# Patient Record
Sex: FEMALE | Race: WHITE
[De-identification: names, ages, dates, MRNs, and addresses within clinical notes are randomized per-mention and may not be internally consistent; named-entity substitution may affect disease eponyms.]

---

## 2019-06-05 ENCOUNTER — HOSPITAL ENCOUNTER (EMERGENCY)
Dept: HOSPITAL 92 - ERS | Age: 64
Discharge: HOME | End: 2019-06-05
Payer: SELF-PAY

## 2019-06-05 DIAGNOSIS — Z79.84: ICD-10-CM

## 2019-06-05 DIAGNOSIS — I10: ICD-10-CM

## 2019-06-05 DIAGNOSIS — R59.0: Primary | ICD-10-CM

## 2019-06-05 DIAGNOSIS — E78.5: ICD-10-CM

## 2019-06-05 DIAGNOSIS — Z79.82: ICD-10-CM

## 2019-06-05 DIAGNOSIS — F32.9: ICD-10-CM

## 2019-06-05 DIAGNOSIS — E11.9: ICD-10-CM

## 2019-06-05 LAB
ALBUMIN SERPL BCG-MCNC: 3.9 G/DL (ref 3.4–4.8)
ALP SERPL-CCNC: 97 U/L (ref 40–150)
ALT SERPL W P-5'-P-CCNC: 12 U/L (ref 8–55)
ANION GAP SERPL CALC-SCNC: 10 MMOL/L (ref 10–20)
AST SERPL-CCNC: 12 U/L (ref 5–34)
BASOPHILS # BLD AUTO: 0 THOU/UL (ref 0–0.2)
BASOPHILS NFR BLD AUTO: 0.1 % (ref 0–1)
BILIRUB SERPL-MCNC: 0.5 MG/DL (ref 0.2–1.2)
BUN SERPL-MCNC: 8 MG/DL (ref 9.8–20.1)
CALCIUM SERPL-MCNC: 9.3 MG/DL (ref 7.8–10.44)
CHLORIDE SERPL-SCNC: 103 MMOL/L (ref 98–107)
CO2 SERPL-SCNC: 27 MMOL/L (ref 23–31)
CREAT CL PREDICTED SERPL C-G-VRATE: 0 ML/MIN (ref 70–130)
EOSINOPHIL # BLD AUTO: 0.6 THOU/UL (ref 0–0.7)
EOSINOPHIL NFR BLD AUTO: 5.7 % (ref 0–10)
GLOBULIN SER CALC-MCNC: 3.1 G/DL (ref 2.4–3.5)
GLUCOSE SERPL-MCNC: 231 MG/DL (ref 80–115)
HGB BLD-MCNC: 12.5 G/DL (ref 12–16)
LYMPHOCYTES # BLD: 1.7 THOU/UL (ref 1.2–3.4)
LYMPHOCYTES NFR BLD AUTO: 17 % (ref 21–51)
MCH RBC QN AUTO: 28.8 PG (ref 27–31)
MCV RBC AUTO: 86.3 FL (ref 78–98)
MONOCYTES # BLD AUTO: 1.2 THOU/UL (ref 0.11–0.59)
MONOCYTES NFR BLD AUTO: 12 % (ref 0–10)
NEUTROPHILS # BLD AUTO: 6.3 THOU/UL (ref 1.4–6.5)
NEUTROPHILS NFR BLD AUTO: 65.1 % (ref 42–75)
PLATELET # BLD AUTO: 285 THOU/UL (ref 130–400)
POTASSIUM SERPL-SCNC: 3.9 MMOL/L (ref 3.5–5.1)
RBC # BLD AUTO: 4.35 MILL/UL (ref 4.2–5.4)
SODIUM SERPL-SCNC: 136 MMOL/L (ref 136–145)
WBC # BLD AUTO: 9.7 THOU/UL (ref 4.8–10.8)

## 2019-06-05 PROCEDURE — 70491 CT SOFT TISSUE NECK W/DYE: CPT

## 2019-06-05 PROCEDURE — 85025 COMPLETE CBC W/AUTO DIFF WBC: CPT

## 2019-06-05 PROCEDURE — 84443 ASSAY THYROID STIM HORMONE: CPT

## 2019-06-05 PROCEDURE — 96374 THER/PROPH/DIAG INJ IV PUSH: CPT

## 2019-06-05 PROCEDURE — 80053 COMPREHEN METABOLIC PANEL: CPT

## 2019-06-05 NOTE — CT
CONTRAST ENHANCED CT IMAGES OF SOFT TISSUE NECK:

6/5/19

 

HISTORY: 

Difficulty swallowing for three days. 

 

Contrast enhanced CT of the soft tissue neck obtained. 

 

There is massive bilateral submandibular, sublingual, deep cervical and spinal accessory chain lympha
denopathy. The extensive lymphadenopathy extends into the supra and subclavicular regions as well as 
involve the axillary regions which are visualized. 

 

Numerous necrotic lymph nodes seen in the left level III and level IV regions. ENT consultation is re
commended. There is high concern for extensive metastatic disease and malignancy.

 

The parotid glands demonstrate no definite evidence of masses except for some bilateral inferior paro
tid lymph node enlargement also likely due to metastatic disease. 

 

IMPRESSION: 

Extensive bilateral lymphadenopathy, worse on the left than on the right with numerous and markedly e
nlarged extranodal spread of tumor with numerous enlarged necrotic lymph nodes. Other non-necrotic ly
mph nodes also extensively present. 

 

POS: [facility]

## 2019-06-12 ENCOUNTER — HOSPITAL ENCOUNTER (OUTPATIENT)
Dept: HOSPITAL 92 - SDC | Age: 64
Discharge: HOME | End: 2019-06-12
Attending: OTOLARYNGOLOGY
Payer: COMMERCIAL

## 2019-06-12 VITALS — BODY MASS INDEX: 40.2 KG/M2

## 2019-06-12 DIAGNOSIS — R59.9: Primary | ICD-10-CM

## 2019-06-12 DIAGNOSIS — F32.9: ICD-10-CM

## 2019-06-12 DIAGNOSIS — G89.29: ICD-10-CM

## 2019-06-12 DIAGNOSIS — E78.00: ICD-10-CM

## 2019-06-12 DIAGNOSIS — Z79.899: ICD-10-CM

## 2019-06-12 DIAGNOSIS — Z79.84: ICD-10-CM

## 2019-06-12 PROCEDURE — 88360 TUMOR IMMUNOHISTOCHEM/MANUAL: CPT

## 2019-06-12 PROCEDURE — 93005 ELECTROCARDIOGRAM TRACING: CPT

## 2019-06-12 PROCEDURE — 88342 IMHCHEM/IMCYTCHM 1ST ANTB: CPT

## 2019-06-12 PROCEDURE — 07T00ZZ RESECTION OF HEAD LYMPHATIC, OPEN APPROACH: ICD-10-PCS | Performed by: OTOLARYNGOLOGY

## 2019-06-12 PROCEDURE — 88184 FLOWCYTOMETRY/ TC 1 MARKER: CPT

## 2019-06-12 PROCEDURE — 93010 ELECTROCARDIOGRAM REPORT: CPT

## 2019-06-12 PROCEDURE — 88341 IMHCHEM/IMCYTCHM EA ADD ANTB: CPT

## 2019-06-12 PROCEDURE — 07T20ZZ RESECTION OF LEFT NECK LYMPHATIC, OPEN APPROACH: ICD-10-PCS | Performed by: OTOLARYNGOLOGY

## 2019-06-12 PROCEDURE — 88307 TISSUE EXAM BY PATHOLOGIST: CPT

## 2019-06-13 NOTE — OP
DATE OF PROCEDURE:  06/12/2019



PREOPERATIVE DIAGNOSIS:  Left neck lymphadenopathy.



POSTOPERATIVE DIAGNOSIS:  Left neck lymphadenopathy.



PROCEDURE:  Excisional biopsy of the lips and neck lymph nodes.



ESTIMATED BLOOD LOSS:  5 mL.



COMPLICATIONS:  None.



ANESTHESIA:  GETA.



DESCRIPTION OF PROCEDURE:  The patient was taken to the operating room and placed

supine on the table.  General endotracheal anesthesia was obtained by the anesthesia

staff.  The head was gently tilted exposing the left neck wound.  The left neck was

then prepped and draped in standard surgical fashion.  Following this, a horizontal

incision was made in left level 5.  An incision was made through then skin,

subcutaneous tissue, and the platysmal layer.  Following this, immediate massive

amount of bulky cervical lymphadenopathy was encountered.  A large 3 cm lymph node

was then excised and sent for fresh pathological analysis.  The wound was irrigated

and then closed using Monocryl stitches and staples for the skin.  The patient

tolerated the procedure well. 







Job ID:  [id number]

## 2019-06-14 NOTE — EKG
Test Reason : 

Blood Pressure : ***/*** mmHG

Vent. Rate : 071 BPM     Atrial Rate : 071 BPM

   P-R Int : 174 ms          QRS Dur : 098 ms

    QT Int : 426 ms       P-R-T Axes : 070 -06 019 degrees

   QTc Int : 462 ms

 

Normal sinus rhythm

ST abnormality, possible digitalis effect

Abnormal ECG

When compared with ECG of 01-DEC-2010 22:58,

T wave inversion less evident in Inferior leads

T wave inversion no longer evident in Anterior leads

QT has lengthened

Confirmed by FERNANDEZ VALLE ([id number]) on 6/14/2019 9:16:17 PM

 

Referred By:  NEIL           Confirmed By:FERNANDEZ VALLE

## 2019-06-25 ENCOUNTER — HOSPITAL ENCOUNTER (OUTPATIENT)
Dept: HOSPITAL 92 - PET | Age: 64
Discharge: HOME | End: 2019-06-25
Attending: INTERNAL MEDICINE
Payer: COMMERCIAL

## 2019-06-25 DIAGNOSIS — C82.31: Primary | ICD-10-CM

## 2019-06-25 PROCEDURE — 78815 PET IMAGE W/CT SKULL-THIGH: CPT

## 2019-06-25 PROCEDURE — A9552 F18 FDG: HCPCS

## 2019-06-25 NOTE — PET
Nuclear medicine FDG PET/CT:

(Positron emission tomography and computed tomography)



DATE:

6/25/2019



HISTORY:

63-year-old female with follicular lymphoma grade 3 a, initial staging



COMPARISON:

no prior PET scans



TECHNIQUE:

IV injection of F-18 fluorodeoxyglucose (FDG) dose: 13.2 mCi.

PET scan and attenuation correction CT performed from skull base to proximal thighs.

PET scan and attenuation correction CT thinner slices performed through head and neck.



FINDINGS:

SUV (standard uptake values) numbers given are maximum SUVs.

QCLR used.



There are significantly enlarged cervical lymph nodes bilaterally at almost all levels. The left-side
d lymph nodes are asymmetrically larger than the right. The largest conglomeration is on the left

at level 4 and level 3, with previous contrast enhanced CT demonstrating central low attenuation sugg
estive of necrosis there. Examples of some of the degree of FDG uptake are as follows:



Left level 2: SUV 6

Left level 1B: SUV 4.2

Left level 5A: SUV 7.2

Left level 5B: SUV 7.5

Left level 4: SUV 6.3

Left level 3: SUV 6.9



Right level 1B: SUV 3.1

Right level 2: SUV 5.2

Right level 3: SUV 3.5

Right level 4: SUV 3.2



There are several abnormally mildly enlarged and abnormally shaped round left axillary and subpectora
l lymph nodes:



Left axillary: SUV 7.1



There are multiple mildly enlarged mediastinal lymph nodes. Most of them have SUVs less than 3, altho
ugh they still appear suspicious based on shape and number. There is 1 left lateral prevascular

space lymph node with SUV 3.5.



Approximate 5.5 x 3.5 cm left para-aortic retroperitoneal lymph node with SUV at least 9.1  ( SUVs of
 up to 13 are obtained, but those measurements probably include FDG within the left proximal

ureter, and therefore are invalid).



Left common iliac lymph node of approximately 2.5- 3 cm SUV 5.3.



1.5 cm right common iliac lymph node with SUV 3.9.



At the right pelvic inlet there is SUV of 7.4 in what appears to be a conglomeration of small bowel l
oops. This may be activity in the lumen. Uncertain whether this is actual lymphoma activity in the

bowel wall.



3 x 3 cm left internal iliac chain lymph node with SUV of 8.5.



1.5 x 1 cm right inguinal lymph node with SUV of 3.8.



Inferior to that, several other enlarged inguinal lymph nodes, including 3 x 2 cm lower right inguina
l lymph node with SUV of 5.0.



At contralateral left lower inguinal nodes. For example, 2.5 x 3.5 cm node with SUV of 5.9.





IMPRESSION:

1) active lymphoma involvement in numerous locations of neck, chest, abdomen, and pelvis.

2) the worst is in the left neck where there is massive lymphadenopathy with SUV up to 7.5.

3) highest SUV involves large left para-aortic retroperitoneal mass with SUV 9.1.





Reported By: Dayton Zhu 

Electronically Signed:  6/25/2019 2:21 PM

## 2019-06-27 ENCOUNTER — HOSPITAL ENCOUNTER (OUTPATIENT)
Dept: HOSPITAL 92 - SDC | Age: 64
Discharge: HOME | End: 2019-06-27
Attending: SURGERY
Payer: COMMERCIAL

## 2019-06-27 VITALS — BODY MASS INDEX: 40 KG/M2

## 2019-06-27 DIAGNOSIS — Z95.1: ICD-10-CM

## 2019-06-27 DIAGNOSIS — E78.00: ICD-10-CM

## 2019-06-27 DIAGNOSIS — I11.9: ICD-10-CM

## 2019-06-27 DIAGNOSIS — F32.9: ICD-10-CM

## 2019-06-27 DIAGNOSIS — Z79.899: ICD-10-CM

## 2019-06-27 DIAGNOSIS — Z79.82: ICD-10-CM

## 2019-06-27 DIAGNOSIS — C82.91: Primary | ICD-10-CM

## 2019-06-27 DIAGNOSIS — Z98.890: ICD-10-CM

## 2019-06-27 PROCEDURE — 71045 X-RAY EXAM CHEST 1 VIEW: CPT

## 2019-06-27 PROCEDURE — C1788 PORT, INDWELLING, IMP: HCPCS

## 2019-06-27 PROCEDURE — 0JH63WZ INSERTION OF TOTALLY IMPLANTABLE VASCULAR ACCESS DEVICE INTO CHEST SUBCUTANEOUS TISSUE AND FASCIA, PERCUTANEOUS APPROACH: ICD-10-PCS | Performed by: SURGERY

## 2019-06-27 PROCEDURE — 76000 FLUOROSCOPY <1 HR PHYS/QHP: CPT

## 2019-06-27 NOTE — OP
DATE OF PROCEDURE:  06/27/2019



PREOPERATIVE DIAGNOSIS:  Lymphoma.



POSTOPERATIVE DIAGNOSIS:  Lymphoma.



PROCEDURE PERFORMED:  Tunneled central line subcutaneous port (MediPort).



ANESTHESIA:  TIVA, local.



ESTIMATED BLOOD LOSS:  Minimal.



COMPLICATIONS:  None.



SPECIMEN:  None.



FINDINGS:  Tip of the catheter at the atriocaval junction.



DESCRIPTION OF PROCEDURE:  The patient was taken to the operating room and laid

supine on the operating room table.  After sedation was obtained, bilateral neck and

chest were prepped and draped in a sterile fashion.  Local anesthetic was

infiltrated over the right internal jugular vein.  Internal jugular vein was

cannulated using a 22-gauge finder needle, followed by a Seldinger needle.

Ultrasound was used to access the vein as well for safety.  A small nick was made to

at the wire entrance site.  A separate 3 cm incision was made in the right upper

chest.  Subcutaneous pocket was made below the lower incision.  Tubing for the

MediPort tunneled from the inferior to superior incision.  Introducer sheath was

placed over the wire into the superior vena cava.  The dilator and wire were

removed.  The end of the catheters sewn into the sheath.  The sheath was peeled

away.  The tip of the catheter was at the atriocaval junction.  MediPort tubing was

cut to fit.  The MediPort at the lower incision, connected to MediPort, which was

sewn into the chest wall in the subcutaneous pocket using 

Prolene.  The MediPort gypsy the blood and flushed without difficulty.  It was

flushed with a heparin flush.  All wounds were irrigated and closed using 3-0

Vicryl, 4-0 Monocryl, 

and Dermabond.  The patient was sent to Recovery in stable condition.  All

instrument counts, needle counts, and lap counts were correct. 







Job ID:  [id number]

## 2019-06-27 NOTE — RAD
EXAM: Single view of the chest



HISTORY:   Mediport placement



COMPARISON: 12/1/2010



FINDINGS: Single view of the chest shows an enlarged but stable cardiomediastinal silhouette.  The pa
tient is status post sternotomy. A right-sided IJ Mediport is seen with its tip in the superior

vena cava. No pneumothorax is seen. There is no evidence of consolidation, mass, or pleural effusion.
 The bones are unremarkable.



IMPRESSION: Status post central line placement without evidence of complication.



Reported By: Mitchell Kee 

Electronically Signed:  6/27/2019 9:55 AM

## 2019-09-06 ENCOUNTER — HOSPITAL ENCOUNTER (OUTPATIENT)
Dept: HOSPITAL 92 - PET | Age: 64
Discharge: HOME | End: 2019-09-06
Attending: INTERNAL MEDICINE
Payer: COMMERCIAL

## 2019-09-06 DIAGNOSIS — C85.90: Primary | ICD-10-CM

## 2019-09-06 DIAGNOSIS — E11.9: ICD-10-CM

## 2019-09-06 PROCEDURE — 78815 PET IMAGE W/CT SKULL-THIGH: CPT

## 2019-09-06 PROCEDURE — A9552 F18 FDG: HCPCS

## 2019-09-06 NOTE — PET
PET CT:

 

HISTORY:  

64-year-old female with high grade large B cell lymphoma. Patient is undergoing chemotherapy. Exam re
quested to evaluate response to therapy. 

 

COMPARISON:  

PET CT dated 06/25/19. 

 

TECHNIQUE:  

PET scanning with CT attenuation correction was performed from the vertex through the proximal thighs
 following the intravenous administration of 13 mCi F18-FDG in the right antecubital fossa. 

 

FINDINGS:

There is residual activity in the left supraclavicular lymph node with a SUV of 2.8. The hyperactivit
y in the remainder of the lymph nodes noted on the previous study has resolved in the interim. 

 

No hypermetabolic pulmonary nodules, liver, adrenal, or skeletal lesions are seen. 

 

There is increased uptake in the right masseter muscle, likely physiologic. There is physiologic acti
vity in the GI and  tracts, and the brain. 

 

The CT scan used for attenuation correction demonstrates no evidence of pleural effusions or ascites.
 There is cholelithiasis, fibroid uterus, and colonic diverticulosis. 

 

The activity in the left supraclavicular lymph node is greater than the mediastinum but less than the
 liver (Deauville 3).

 

IMPRESSION: 

Positive response to therapy with significant improvement since 06/25/19. 

 

 

POS: [facility]

## 2019-11-21 ENCOUNTER — HOSPITAL ENCOUNTER (OUTPATIENT)
Dept: HOSPITAL 92 - PET | Age: 64
Discharge: HOME | End: 2019-11-21
Attending: INTERNAL MEDICINE
Payer: COMMERCIAL

## 2019-11-21 DIAGNOSIS — C82.30: Primary | ICD-10-CM

## 2019-11-21 PROCEDURE — A9552 F18 FDG: HCPCS

## 2019-11-21 PROCEDURE — 78815 PET IMAGE W/CT SKULL-THIGH: CPT

## 2019-11-21 NOTE — PET
PET CT



HISTORY:

Lung mass.



TECHNIQUE:

PET scanning with CT attenuation correction was performed from the vertex of the brain to the proxima
l thighs following the intravenous administration of 12.9 millicuries [id number]. 



COMPARISON: 

PET/CT dated September 6, 2019.



FINDINGS:



Biodistribution: The biodistribution for the exam appears acceptable.



Head and neck: There is appropriate background activity within the brain. The hypermetabolic left sup
raclavicular lymph node demonstrates some mild residual hypermetabolic activity. The peak activity

associated with this lymph node is 2.34 with a mean activity of 2.0. Previously the peak activity was
 2.7 with a mean activity of 2.42. No additional hypermetabolic lymph node is present.



Thorax: No hypermetabolic pulmonary lesion, pleural effusion or lymphadenopathy is present.



Abdomen and pelvis: There is expected background activity within the GI and  systems. No hypermetab
olic mass, lymphadenopathy or ascites is present. There is stable cholelithiasis and a fibroid

uterus. Scarlike prominence in the left periaortic region is stable without associated hypermetabolic
 activity.



Osseous structures and skin: No hypermetabolic skin or osseous lesion is identified. Prominent left g
lenohumeral osteoarthrosis with intra-articular bodies is stable. There is a stable right chest

wall port.



IMPRESSION:

Deauville score of 3.0. PET CT findings consistent with complete response. The hypermetabolic activit
y associated with the left supraclavicular lymph node has decreased from the most recent PET scan

dated September 6, 2019.



Transcribed Date/Time: 11/21/2019 12:20 PM



Reported By: Gene Chavis 

Electronically Signed:  11/21/2019 1:49 PM

## 2020-01-03 ENCOUNTER — HOSPITAL ENCOUNTER (EMERGENCY)
Dept: HOSPITAL 92 - ERS | Age: 65
Discharge: HOME | End: 2020-01-03
Payer: SELF-PAY

## 2020-01-03 DIAGNOSIS — Z79.82: ICD-10-CM

## 2020-01-03 DIAGNOSIS — E78.5: ICD-10-CM

## 2020-01-03 DIAGNOSIS — Z79.899: ICD-10-CM

## 2020-01-03 DIAGNOSIS — F32.9: ICD-10-CM

## 2020-01-03 DIAGNOSIS — E11.9: ICD-10-CM

## 2020-01-03 DIAGNOSIS — I10: ICD-10-CM

## 2020-01-03 DIAGNOSIS — Z79.84: ICD-10-CM

## 2020-01-03 DIAGNOSIS — M48.061: Primary | ICD-10-CM

## 2020-01-03 PROCEDURE — 96372 THER/PROPH/DIAG INJ SC/IM: CPT

## 2020-01-03 PROCEDURE — 72131 CT LUMBAR SPINE W/O DYE: CPT

## 2020-01-03 NOTE — CT
CT LUMBAR SPINE WITHOUT CONTRAST:

 

HISTORY:

Low back pain.

 

FINDINGS:

The vertebral body heights are maintained. Multilevel degenerative changes are seen, manifested by os
teophyte formation, disk space narrowing, broad-based disk bulges and facet hypertrophic changes. The
re are postop changes of left laminectomy at the L4 level. No fracture or subluxation is seen.

 

There is mild central canal stenosis at the L2-L3 level, severe left neural foraminal stenosis at the
 L3-L4 level, mild left sided neural foraminal stenosis, moderate to severe central canal stenosis at
 the L4-L5 level and moderate to severe bilateral neural foraminal stenosis at the L5-S1 level.

 

Incidental note is made of a fibroid uterus.

 

IMPRESSION:

Lumbar spondylosis with multilevel stenotic changes, as discussed above.

 

POS: [facility]

## 2020-02-18 ENCOUNTER — HOSPITAL ENCOUNTER (OUTPATIENT)
Dept: HOSPITAL 92 - BICCT | Age: 65
Discharge: HOME | End: 2020-02-18
Attending: INTERNAL MEDICINE
Payer: COMMERCIAL

## 2020-02-18 DIAGNOSIS — C82.31: Primary | ICD-10-CM

## 2020-02-18 DIAGNOSIS — K80.20: ICD-10-CM

## 2020-02-18 DIAGNOSIS — K57.30: ICD-10-CM

## 2020-02-18 DIAGNOSIS — N28.9: ICD-10-CM

## 2020-02-18 PROCEDURE — 74160 CT ABDOMEN W/CONTRAST: CPT

## 2020-02-18 NOTE — CT
CT ABDOMEN WITH IV CONTRAST

 2/18/2020



CLINICAL INFORMATION:

Follicular lymphoma. Patient complains of severe right sided back and flank pain.



COMPARISON:

 PET/CT exam on 11/21/2019



Technique:

Multiple contiguous axial CT images are obtained through the abdomen and pelvis with IV contrast. Cor
onal reformatted images are provided.



FINDINGS:



Lower Chest: Minimal atelectasis present at the right lung base.

Vessels: Vascular calcifications are seen in the abdominal aorta as well as visualized coronary arter
ies. 



Abdomen:

Portal vein:Patent

Gallbladder: Gallbladder calculi are again visualized.

Liver: within normal limits.

Spleen: within normal limits.

Pancreas: within normal limits.

Adrenals: within normal limits.

Kidneys: Mild scarring superior pole right kidney. There is an 11 mm hypodense lesion in the midporti
on right kidney which is difficult to characterize due to small size but statistically likely

represents a small cyst.



Bowel: Scattered colonic diverticula are seen in the visualized ascending colon. Small amount retaine
d fecal material is seen within the colon. Loops of small bowel are normal in caliber.



Peritoneum: No ascites or free air; no fluid collection.

Mesentery and Retroperitoneum: There is irregular soft tissue density seen in a left periaortic locat
ion greater along the left lateral aspect of the normal aorta below the level of the renal vessels.

This area was also seen on prior PET/CT examination 11/21/2019, but this area does not demonstrate ab
normal uptake of FDG. This was in an area of bulky lymphadenopathy on prior PET/CT exam on

6/25/2029. Findings are likely attributable to response to therapy with residual soft tissue density 
in this region. No new enlarged lymph nodes are seen by CT size criteria.

Abdominal Wall: Tiny fat-containing umbilical hernia.



Bones: Degenerative changes throughout the spine.



Imaging of the pelvis was not performed.



IMPRESSION:



1. Residual soft tissue density in the left periaortic location. This was also seen on prior PET/CT e
xam on 11/21/2019, but no abnormal FDG uptake was seen in this region. This area was in region of

prior bulky lymphadenopathy. Findings are likely related to response to therapy.

No new enlarged lymph nodes are seen within the abdomen.

2. Cholelithiasis.

3. Difficult to characterize hypodense lesion right kidney statistically likely representing a cyst.

4. Colonic diverticulosis.



Reported By: Mehran Rapp 

Electronically Signed:  2/18/2020 9:57 AM

## 2020-09-03 ENCOUNTER — HOSPITAL ENCOUNTER (OUTPATIENT)
Dept: HOSPITAL 92 - LABBT | Age: 65
Discharge: HOME | End: 2020-09-03
Attending: INTERNAL MEDICINE
Payer: COMMERCIAL

## 2020-09-03 DIAGNOSIS — Z01.812: Primary | ICD-10-CM

## 2020-09-03 DIAGNOSIS — Z20.828: ICD-10-CM

## 2020-09-03 LAB
ALBUMIN SERPL BCG-MCNC: 4.4 G/DL (ref 3.4–4.8)
ALP SERPL-CCNC: 100 U/L (ref 40–110)
ALT SERPL W P-5'-P-CCNC: 21 U/L (ref 8–55)
ANION GAP SERPL CALC-SCNC: 15 MMOL/L (ref 10–20)
AST SERPL-CCNC: 18 U/L (ref 5–34)
BASOPHILS # BLD AUTO: 0 THOU/UL (ref 0–0.2)
BASOPHILS NFR BLD AUTO: 0.5 % (ref 0–1)
BILIRUB SERPL-MCNC: 0.5 MG/DL (ref 0.2–1.2)
BUN SERPL-MCNC: 13 MG/DL (ref 9.8–20.1)
CALCIUM SERPL-MCNC: 9.4 MG/DL (ref 7.8–10.44)
CHLORIDE SERPL-SCNC: 103 MMOL/L (ref 98–107)
CO2 SERPL-SCNC: 23 MMOL/L (ref 23–31)
CREAT CL PREDICTED SERPL C-G-VRATE: 0 ML/MIN (ref 70–130)
EOSINOPHIL # BLD AUTO: 0.4 THOU/UL (ref 0–0.7)
EOSINOPHIL NFR BLD AUTO: 6.8 % (ref 0–10)
GLOBULIN SER CALC-MCNC: 2.2 G/DL (ref 2.4–3.5)
GLUCOSE SERPL-MCNC: 281 MG/DL (ref 80–115)
HGB BLD-MCNC: 14.8 G/DL (ref 12–16)
LYMPHOCYTES # BLD: 2.1 THOU/UL (ref 1.2–3.4)
LYMPHOCYTES NFR BLD AUTO: 34.4 % (ref 21–51)
MCH RBC QN AUTO: 30.3 PG (ref 27–31)
MCV RBC AUTO: 89.6 FL (ref 78–98)
MONOCYTES # BLD AUTO: 0.3 THOU/UL (ref 0.11–0.59)
MONOCYTES NFR BLD AUTO: 5.2 % (ref 0–10)
NEUTROPHILS # BLD AUTO: 3.3 THOU/UL (ref 1.4–6.5)
NEUTROPHILS NFR BLD AUTO: 53.2 % (ref 42–75)
PLATELET # BLD AUTO: 252 THOU/UL (ref 130–400)
POTASSIUM SERPL-SCNC: 4.3 MMOL/L (ref 3.5–5.1)
RBC # BLD AUTO: 4.88 MILL/UL (ref 4.2–5.4)
SODIUM SERPL-SCNC: 137 MMOL/L (ref 136–145)
WBC # BLD AUTO: 6.2 THOU/UL (ref 4.8–10.8)

## 2020-09-03 PROCEDURE — 87635 SARS-COV-2 COVID-19 AMP PRB: CPT

## 2020-09-03 PROCEDURE — 80053 COMPREHEN METABOLIC PANEL: CPT

## 2020-09-03 PROCEDURE — 85025 COMPLETE CBC W/AUTO DIFF WBC: CPT

## 2020-09-03 PROCEDURE — U0003 INFECTIOUS AGENT DETECTION BY NUCLEIC ACID (DNA OR RNA); SEVERE ACUTE RESPIRATORY SYNDROME CORONAVIRUS 2 (SARS-COV-2) (CORONAVIRUS DISEASE [COVID-19]), AMPLIFIED PROBE TECHNIQUE, MAKING USE OF HIGH THROUGHPUT TECHNOLOGIES AS DESCRIBED BY CMS-2020-01-R: HCPCS

## 2020-09-08 ENCOUNTER — HOSPITAL ENCOUNTER (OUTPATIENT)
Dept: HOSPITAL 92 - CCL | Age: 65
Discharge: HOME | End: 2020-09-08
Attending: INTERNAL MEDICINE
Payer: COMMERCIAL

## 2020-09-08 VITALS — BODY MASS INDEX: 38.9 KG/M2

## 2020-09-08 DIAGNOSIS — E66.9: ICD-10-CM

## 2020-09-08 DIAGNOSIS — E11.9: ICD-10-CM

## 2020-09-08 DIAGNOSIS — Z82.49: ICD-10-CM

## 2020-09-08 DIAGNOSIS — I25.10: Primary | ICD-10-CM

## 2020-09-08 DIAGNOSIS — I10: ICD-10-CM

## 2020-09-08 DIAGNOSIS — E78.00: ICD-10-CM

## 2020-09-08 DIAGNOSIS — C85.11: ICD-10-CM

## 2020-09-08 DIAGNOSIS — Z79.84: ICD-10-CM

## 2020-09-08 DIAGNOSIS — Z79.899: ICD-10-CM

## 2020-09-08 DIAGNOSIS — Z95.1: ICD-10-CM

## 2020-09-08 PROCEDURE — B2111ZZ FLUOROSCOPY OF MULTIPLE CORONARY ARTERIES USING LOW OSMOLAR CONTRAST: ICD-10-PCS | Performed by: INTERNAL MEDICINE

## 2020-09-08 PROCEDURE — 93005 ELECTROCARDIOGRAM TRACING: CPT

## 2020-09-08 PROCEDURE — 92929: CPT

## 2020-09-08 PROCEDURE — 92928 PRQ TCAT PLMT NTRAC ST 1 LES: CPT

## 2020-09-08 PROCEDURE — 93459 L HRT ART/GRFT ANGIO: CPT

## 2020-09-08 PROCEDURE — 99152 MOD SED SAME PHYS/QHP 5/>YRS: CPT

## 2020-09-08 PROCEDURE — 71045 X-RAY EXAM CHEST 1 VIEW: CPT

## 2020-09-08 PROCEDURE — 99153 MOD SED SAME PHYS/QHP EA: CPT

## 2020-09-08 PROCEDURE — 85347 COAGULATION TIME ACTIVATED: CPT

## 2020-09-08 PROCEDURE — C1876 STENT, NON-COA/NON-COV W/DEL: HCPCS

## 2020-09-08 PROCEDURE — 4A023N7 MEASUREMENT OF CARDIAC SAMPLING AND PRESSURE, LEFT HEART, PERCUTANEOUS APPROACH: ICD-10-PCS | Performed by: INTERNAL MEDICINE

## 2020-09-08 NOTE — RAD
EXAM: Single view of the chest



HISTORY:   Preoperative radiograph



COMPARISON: 6/27/2019



FINDINGS: Single view of the chest shows an enlarged but stable cardiomediastinal silhouette.  Patien
t is status post sternotomy. The Mediport is unchanged in position.  There is no evidence of

consolidation, mass, or pleural effusion. Degenerative changes are seen in the spine.



IMPRESSION: Cardiomegaly



Reported By: Mitchell Kee 

Electronically Signed:  9/8/2020 7:46 AM

## 2020-09-08 NOTE — EKG
Test Reason : POST STENTS X2

Blood Pressure : ***/*** mmHG

Vent. Rate : 059 BPM     Atrial Rate : 059 BPM

   P-R Int : 192 ms          QRS Dur : 096 ms

    QT Int : 536 ms       P-R-T Axes : 041 001 036 degrees

   QTc Int : 530 ms

 

Sinus bradycardia

Nonspecific T wave abnormality

Prolonged QT

Abnormal ECG

No previous ECGs available

Confirmed by FERNANDEZ VALLE ([id number]) on 9/8/2020 10:23:58 PM

 

Referred By:  LOLITA           Confirmed By:FERNANDEZ VALLE

## 2020-12-23 ENCOUNTER — HOSPITAL ENCOUNTER (OUTPATIENT)
Dept: HOSPITAL 92 - SDC | Age: 65
Discharge: HOME | End: 2020-12-23
Attending: SURGERY
Payer: COMMERCIAL

## 2020-12-23 VITALS — BODY MASS INDEX: 38.9 KG/M2

## 2020-12-23 DIAGNOSIS — Z79.82: ICD-10-CM

## 2020-12-23 DIAGNOSIS — Z79.84: ICD-10-CM

## 2020-12-23 DIAGNOSIS — Z79.899: ICD-10-CM

## 2020-12-23 DIAGNOSIS — K80.10: Primary | ICD-10-CM

## 2020-12-23 PROCEDURE — 88304 TISSUE EXAM BY PATHOLOGIST: CPT

## 2020-12-23 PROCEDURE — S0020 INJECTION, BUPIVICAINE HYDRO: HCPCS

## 2020-12-23 PROCEDURE — 0FT44ZZ RESECTION OF GALLBLADDER, PERCUTANEOUS ENDOSCOPIC APPROACH: ICD-10-PCS | Performed by: SURGERY

## 2020-12-23 PROCEDURE — 36416 COLLJ CAPILLARY BLOOD SPEC: CPT

## 2020-12-23 NOTE — OP
DATE OF PROCEDURE:  12/23/2020



PREOPERATIVE DIAGNOSIS:  Symptomatic cholelithiasis.



PROCEDURE PERFORMED:  Laparoscopic cholecystectomy.



INDICATIONS:  A 65-year-old female, who has been having severe right upper quadrant

pain.  Ultrasound showed cholelithiasis. 



FINDINGS:  She had a small caliber cystic duct.  Normal liver functions.



DESCRIPTION OF PROCEDURE:  After informed consent was obtained, the patient was

taken to the operating room and given general endotracheal anesthesia.  She was

placed in the supine position.  Abdomen was prepped and draped in usual fashion.

Local anesthesia was infiltrated subcutaneously and deep, and a subumbilical

incision was performed.  Subcu divided sharply.  The fascia was grasped and 2 stay

sutures of 0 Vicryl placed through each side of midline.  Midline incised.  Digital

palpation revealed no local adhesions.  A blunt 12-mm trocar inserted.

Pneumoperitoneum was created to a pressure of 15 mmHg.  A 0-degree laparoscope

inserted under direct vision, three 5-mm ports were placed subcostally.  The

gallbladder grasped, advanced superiorly.  The peritoneum lysed distally to expose

the cystic duct, cystic artery in critical view.  The duct and artery triply ligated

with hemoclips and divided.  The gallbladder removed from its fossa utilizing

electrocautery, removed from the abdomen through the umbilical port.  Hemostasis was

achieved with electrocautery and Keisha powder.  The trocars and retractors were

removed.  The fascia closed with interrupted 0 Vicryl suture.  The skin closed with

interrupted 4-0 Rapide.  Dermabond applied.  The patient tolerated the procedure

well, transferred to Recovery in good condition.  Sponge and needle count verified

correct x2. 







Job ID:  [id number]

## 2021-03-24 ENCOUNTER — HOSPITAL ENCOUNTER (OUTPATIENT)
Dept: HOSPITAL 92 - BICCT | Age: 66
Discharge: HOME | End: 2021-03-24
Attending: INTERNAL MEDICINE
Payer: COMMERCIAL

## 2021-03-24 DIAGNOSIS — D25.9: ICD-10-CM

## 2021-03-24 DIAGNOSIS — C82.31: Primary | ICD-10-CM

## 2021-03-24 DIAGNOSIS — I51.7: ICD-10-CM

## 2021-03-24 DIAGNOSIS — Z90.49: ICD-10-CM

## 2021-03-24 DIAGNOSIS — Z95.9: ICD-10-CM

## 2021-03-24 PROCEDURE — 71260 CT THORAX DX C+: CPT

## 2021-03-24 PROCEDURE — 70491 CT SOFT TISSUE NECK W/DYE: CPT

## 2021-03-24 PROCEDURE — 74177 CT ABD & PELVIS W/CONTRAST: CPT

## 2021-04-14 ENCOUNTER — HOSPITAL ENCOUNTER (OUTPATIENT)
Dept: HOSPITAL 92 - LABBT | Age: 66
Discharge: HOME | End: 2021-04-14
Attending: PHYSICAL MEDICINE & REHABILITATION
Payer: COMMERCIAL

## 2021-04-14 DIAGNOSIS — Z20.822: ICD-10-CM

## 2021-04-14 DIAGNOSIS — Z01.812: Primary | ICD-10-CM

## 2021-04-14 PROCEDURE — U0005 INFEC AGEN DETEC AMPLI PROBE: HCPCS

## 2021-04-14 PROCEDURE — 87635 SARS-COV-2 COVID-19 AMP PRB: CPT

## 2021-04-14 PROCEDURE — U0003 INFECTIOUS AGENT DETECTION BY NUCLEIC ACID (DNA OR RNA); SEVERE ACUTE RESPIRATORY SYNDROME CORONAVIRUS 2 (SARS-COV-2) (CORONAVIRUS DISEASE [COVID-19]), AMPLIFIED PROBE TECHNIQUE, MAKING USE OF HIGH THROUGHPUT TECHNOLOGIES AS DESCRIBED BY CMS-2020-01-R: HCPCS

## 2021-04-29 ENCOUNTER — HOSPITAL ENCOUNTER (OUTPATIENT)
Dept: HOSPITAL 92 - CSHLAB | Age: 66
Discharge: HOME | End: 2021-04-29
Attending: PHYSICAL MEDICINE & REHABILITATION
Payer: COMMERCIAL

## 2021-04-29 DIAGNOSIS — Z20.822: Primary | ICD-10-CM

## 2021-04-29 PROCEDURE — U0003 INFECTIOUS AGENT DETECTION BY NUCLEIC ACID (DNA OR RNA); SEVERE ACUTE RESPIRATORY SYNDROME CORONAVIRUS 2 (SARS-COV-2) (CORONAVIRUS DISEASE [COVID-19]), AMPLIFIED PROBE TECHNIQUE, MAKING USE OF HIGH THROUGHPUT TECHNOLOGIES AS DESCRIBED BY CMS-2020-01-R: HCPCS

## 2021-04-29 PROCEDURE — U0005 INFEC AGEN DETEC AMPLI PROBE: HCPCS

## 2021-04-29 PROCEDURE — 87635 SARS-COV-2 COVID-19 AMP PRB: CPT

## 2021-05-03 ENCOUNTER — HOSPITAL ENCOUNTER (OUTPATIENT)
Dept: HOSPITAL 92 - SDC/OP | Age: 66
Discharge: HOME | End: 2021-05-03
Attending: PHYSICAL MEDICINE & REHABILITATION
Payer: COMMERCIAL

## 2021-05-03 VITALS — BODY MASS INDEX: 38.8 KG/M2

## 2021-05-03 DIAGNOSIS — M51.24: ICD-10-CM

## 2021-05-03 DIAGNOSIS — Z79.84: ICD-10-CM

## 2021-05-03 DIAGNOSIS — M47.812: Primary | ICD-10-CM

## 2021-05-03 DIAGNOSIS — Z79.899: ICD-10-CM

## 2021-05-03 DIAGNOSIS — M48.02: ICD-10-CM

## 2021-05-03 DIAGNOSIS — M50.30: ICD-10-CM

## 2021-05-03 DIAGNOSIS — Z79.82: ICD-10-CM

## 2021-05-03 DIAGNOSIS — M50.21: ICD-10-CM

## 2021-05-03 DIAGNOSIS — E66.3: ICD-10-CM

## 2021-05-03 PROCEDURE — S0028 INJECTION, FAMOTIDINE, 20 MG: HCPCS

## 2021-05-03 PROCEDURE — 72146 MRI CHEST SPINE W/O DYE: CPT

## 2021-05-03 PROCEDURE — 72141 MRI NECK SPINE W/O DYE: CPT

## 2022-06-29 ENCOUNTER — HOSPITAL ENCOUNTER (OUTPATIENT)
Dept: HOSPITAL 92 - BICCT | Age: 67
Discharge: HOME | End: 2022-06-29
Attending: INTERNAL MEDICINE
Payer: COMMERCIAL

## 2022-06-29 DIAGNOSIS — R91.8: Primary | ICD-10-CM

## 2022-06-29 DIAGNOSIS — C82.31: ICD-10-CM

## 2022-06-29 PROCEDURE — 71250 CT THORAX DX C-: CPT

## 2023-03-07 ENCOUNTER — HOSPITAL ENCOUNTER (OUTPATIENT)
Dept: HOSPITAL 92 - RAD-FRANK | Age: 68
Discharge: HOME | End: 2023-03-07
Attending: PHYSICAL MEDICINE & REHABILITATION
Payer: COMMERCIAL

## 2023-03-07 DIAGNOSIS — M89.412: ICD-10-CM

## 2023-03-07 DIAGNOSIS — M25.712: ICD-10-CM

## 2023-03-07 DIAGNOSIS — M25.512: Primary | ICD-10-CM

## 2023-03-07 DIAGNOSIS — M24.012: ICD-10-CM

## 2023-03-07 PROCEDURE — 85025 COMPLETE CBC W/AUTO DIFF WBC: CPT

## 2023-03-07 PROCEDURE — 80061 LIPID PANEL: CPT

## 2024-06-11 ENCOUNTER — HOSPITAL ENCOUNTER (OUTPATIENT)
Dept: HOSPITAL 92 - BICCT | Age: 69
Discharge: HOME | End: 2024-06-11
Attending: INTERNAL MEDICINE
Payer: COMMERCIAL

## 2024-06-11 DIAGNOSIS — N28.1: ICD-10-CM

## 2024-06-11 DIAGNOSIS — D25.9: ICD-10-CM

## 2024-06-11 DIAGNOSIS — N85.8: ICD-10-CM

## 2024-06-11 DIAGNOSIS — C82.31: Primary | ICD-10-CM

## 2024-06-11 DIAGNOSIS — J84.10: ICD-10-CM

## 2024-06-11 DIAGNOSIS — M47.9: ICD-10-CM

## 2024-06-11 LAB — EGFRCR SERPLBLD CKD-EPI 2021: 95 ML/MIN/{1.73_M2}

## 2024-06-11 PROCEDURE — 74177 CT ABD & PELVIS W/CONTRAST: CPT

## 2024-06-11 PROCEDURE — 71260 CT THORAX DX C+: CPT

## 2024-06-11 PROCEDURE — 82565 ASSAY OF CREATININE: CPT
